# Patient Record
Sex: FEMALE
[De-identification: names, ages, dates, MRNs, and addresses within clinical notes are randomized per-mention and may not be internally consistent; named-entity substitution may affect disease eponyms.]

---

## 2022-12-05 ENCOUNTER — APPOINTMENT (OUTPATIENT)
Dept: PULMONOLOGY | Facility: CLINIC | Age: 52
End: 2022-12-05

## 2022-12-05 VITALS
DIASTOLIC BLOOD PRESSURE: 100 MMHG | BODY MASS INDEX: 32.2 KG/M2 | WEIGHT: 175 LBS | OXYGEN SATURATION: 99 % | SYSTOLIC BLOOD PRESSURE: 159 MMHG | HEART RATE: 71 BPM | HEIGHT: 62 IN

## 2022-12-05 VITALS — TEMPERATURE: 97 F

## 2022-12-05 DIAGNOSIS — R06.83 SNORING: ICD-10-CM

## 2022-12-05 DIAGNOSIS — Z83.3 FAMILY HISTORY OF DIABETES MELLITUS: ICD-10-CM

## 2022-12-05 DIAGNOSIS — Z78.9 OTHER SPECIFIED HEALTH STATUS: ICD-10-CM

## 2022-12-05 DIAGNOSIS — G47.19 OTHER HYPERSOMNIA: ICD-10-CM

## 2022-12-05 DIAGNOSIS — R06.00 DYSPNEA, UNSPECIFIED: ICD-10-CM

## 2022-12-05 PROBLEM — Z00.00 ENCOUNTER FOR PREVENTIVE HEALTH EXAMINATION: Status: ACTIVE | Noted: 2022-12-05

## 2022-12-05 PROCEDURE — 99204 OFFICE O/P NEW MOD 45 MIN: CPT

## 2022-12-05 RX ORDER — ATORVASTATIN CALCIUM 10 MG/1
10 TABLET, FILM COATED ORAL
Qty: 30 | Refills: 0 | Status: ACTIVE | COMMUNITY
Start: 2022-05-09

## 2022-12-05 RX ORDER — FENOFIBRATE 67 MG/1
67 CAPSULE ORAL
Refills: 0 | Status: ACTIVE | COMMUNITY

## 2022-12-09 NOTE — HISTORY OF PRESENT ILLNESS
[TextBox_4] : 52F had covid in feb 2021 and hospitalized at [facility] and required NRB support. Was discharged on O2 after 2 weeks and has since came off it about 1 mo later. PE was ruled out at that time.\par No h/o lung problems.\par Currently reports TENORIO above baseline, cannot dance anymore and previously used chelsea and cannot anymore.\par Pt gained about 10lb total.\par Pt suffers from short term memory problems.\par Has normal blood work, not anemia.\par

## 2022-12-09 NOTE — CONSULT LETTER
[Dear  ___] : Dear  [unfilled], [Consult Letter:] : I had the pleasure of evaluating your patient, [unfilled]. [Please see my note below.] : Please see my note below. [Consult Closing:] : Thank you very much for allowing me to participate in the care of this patient.  If you have any questions, please do not hesitate to contact me. [Sincerely,] : Sincerely, [FreeTextEntry3] : Marily Quinteros MD, [facility]

## 2022-12-09 NOTE — ASSESSMENT
[FreeTextEntry1] : TENORIO\par post covid\par will obtain imaging and PFT\par \par Snoring and daytime sleepiness \par refer for sleep study

## 2023-01-06 ENCOUNTER — APPOINTMENT (OUTPATIENT)
Dept: SLEEP CENTER | Facility: CLINIC | Age: 53
End: 2023-01-06

## 2023-02-14 ENCOUNTER — APPOINTMENT (OUTPATIENT)
Dept: PULMONOLOGY | Facility: CLINIC | Age: 53
End: 2023-02-14